# Patient Record
Sex: MALE | Race: WHITE | NOT HISPANIC OR LATINO | Employment: FULL TIME | URBAN - METROPOLITAN AREA
[De-identification: names, ages, dates, MRNs, and addresses within clinical notes are randomized per-mention and may not be internally consistent; named-entity substitution may affect disease eponyms.]

---

## 2018-02-02 ENCOUNTER — APPOINTMENT (EMERGENCY)
Dept: RADIOLOGY | Facility: HOSPITAL | Age: 28
End: 2018-02-02
Payer: COMMERCIAL

## 2018-02-02 ENCOUNTER — HOSPITAL ENCOUNTER (EMERGENCY)
Facility: HOSPITAL | Age: 28
Discharge: HOME | End: 2018-02-02
Attending: EMERGENCY MEDICINE
Payer: COMMERCIAL

## 2018-02-02 VITALS
SYSTOLIC BLOOD PRESSURE: 158 MMHG | DIASTOLIC BLOOD PRESSURE: 96 MMHG | HEART RATE: 68 BPM | RESPIRATION RATE: 17 BRPM | HEIGHT: 72 IN | WEIGHT: 220 LBS | TEMPERATURE: 98.3 F | OXYGEN SATURATION: 99 % | BODY MASS INDEX: 29.8 KG/M2

## 2018-02-02 DIAGNOSIS — R07.9 RIGHT-SIDED CHEST PAIN: ICD-10-CM

## 2018-02-02 DIAGNOSIS — R05.9 COUGH: Primary | ICD-10-CM

## 2018-02-02 PROCEDURE — 71046 X-RAY EXAM CHEST 2 VIEWS: CPT

## 2018-02-02 PROCEDURE — 93005 ELECTROCARDIOGRAM TRACING: CPT

## 2018-02-02 PROCEDURE — 99284 EMERGENCY DEPT VISIT MOD MDM: CPT

## 2018-02-02 RX ORDER — NAPROXEN 250 MG/1
500 TABLET ORAL ONCE
Status: COMPLETED | OUTPATIENT
Start: 2018-02-02 | End: 2018-02-02

## 2018-02-02 RX ORDER — OXYMETAZOLINE HYDROCHLORIDE 0.05 G/100ML
2 SPRAY NASAL ONCE
Status: COMPLETED | OUTPATIENT
Start: 2018-02-02 | End: 2018-02-02

## 2018-02-02 RX ORDER — CYCLOBENZAPRINE HCL 10 MG
10 TABLET ORAL ONCE
Status: COMPLETED | OUTPATIENT
Start: 2018-02-02 | End: 2018-02-02

## 2018-02-02 RX ORDER — CYCLOBENZAPRINE HCL 10 MG
10 TABLET ORAL 3 TIMES DAILY PRN
Qty: 21 TABLET | Refills: 0 | Status: SHIPPED | OUTPATIENT
Start: 2018-02-02 | End: 2018-02-09

## 2018-02-02 RX ORDER — NAPROXEN 500 MG/1
500 TABLET ORAL 2 TIMES DAILY WITH MEALS
Qty: 20 TABLET | Refills: 0 | Status: SHIPPED | OUTPATIENT
Start: 2018-02-02 | End: 2018-02-12

## 2018-02-02 RX ORDER — ALBUTEROL SULFATE 90 UG/1
2 AEROSOL, METERED RESPIRATORY (INHALATION) ONCE
Status: COMPLETED | OUTPATIENT
Start: 2018-02-02 | End: 2018-02-02

## 2018-02-02 RX ADMIN — OXYMETAZOLINE HYDROCHLORIDE 2 SPRAY: 5 SPRAY NASAL at 18:58

## 2018-02-02 RX ADMIN — NAPROXEN 500 MG: 250 TABLET ORAL at 18:58

## 2018-02-02 RX ADMIN — CYCLOBENZAPRINE HYDROCHLORIDE 10 MG: 10 TABLET, FILM COATED ORAL at 19:00

## 2018-02-02 RX ADMIN — ALBUTEROL SULFATE 2 PUFF: 90 AEROSOL, METERED RESPIRATORY (INHALATION) at 18:59

## 2018-02-02 NOTE — ED PROVIDER NOTES
History  Chief Complaint   Patient presents with    Chest Pain     Pt presnets with a severe cold/cough over the last three weeks  Pt is concerned because he coughed so hard today he felt a "pop" on the right side, and now is exp pain with inspiration     31-year-old male without past medical history presenting with chief complaint of chest pain and cough  Patient is here with his mother and reports that for the last 2-3 weeks he has had hacking cough postnasal drip, earlier this week his young son accidentally hit him in the right ribs and he has had right-sided rib pain described as sharp and stabbing it is worse when he takes a deep breath and moves, he states that today he was coughing and felt thought he felt a pop in the right side of his chest in his right axilla sad worsening discomfort  Patient is localized to his right anterior lateral chest and axilla is nonradiating it is again worse when he touches it lays on that side twists bends moves and takes deep breath in, he has not taken anything for this, he denies associated symptoms with this although he notes that he is currently under the care of [facility] doctor for recurrent ear infections  Patient denies recent fevers change in ear pain or drainage denies sore throat neck pain denies hemoptysis production shortness of breath at this time other recent viral illness he denies other chest pain nausea vomiting abdominal pain flank pain change in bowels or bladder leg swelling calf pain tenderness rashes or other associated symptoms  Denies all risk factors other signs symptoms of DVT or PE denies tobacco use no risk factors for ACS  None       History reviewed  No pertinent past medical history  Past Surgical History:   Procedure Laterality Date    TONSILLECTOMY         History reviewed  No pertinent family history  I have reviewed and agree with the history as documented      Social History   Substance Use Topics    Smoking status: Never Smoker    Smokeless tobacco: Never Used    Alcohol use Yes      Comment: social        Review of Systems   Constitutional: Negative for activity change, appetite change, chills and fever  HENT: Positive for postnasal drip and rhinorrhea  Negative for ear pain, facial swelling and sore throat  Eyes: Negative for photophobia and pain  Respiratory: Positive for cough  Negative for shortness of breath  Cardiovascular: Positive for chest pain  Negative for palpitations  Gastrointestinal: Negative for abdominal pain, diarrhea, nausea and vomiting  Endocrine: Negative for polydipsia and polyphagia  Genitourinary: Negative for dysuria, frequency and urgency  Musculoskeletal: Negative for arthralgias, back pain and myalgias  Skin: Negative for color change and rash  Neurological: Negative for dizziness and weakness  Hematological: Negative for adenopathy  Does not bruise/bleed easily  Psychiatric/Behavioral: Negative for agitation and behavioral problems  All other systems reviewed and are negative  Physical Exam  ED Triage Vitals [02/02/18 1703]   Temperature Pulse Respirations Blood Pressure SpO2   98 3 °F (36 8 °C) 68 17 158/96 99 %      Temp Source Heart Rate Source Patient Position - Orthostatic VS BP Location FiO2 (%)   Oral Monitor Sitting Left arm --      Pain Score       Worst Possible Pain           Orthostatic Vital Signs  Vitals:    02/02/18 1703   BP: 158/96   Pulse: 68   Patient Position - Orthostatic VS: Sitting       Physical Exam   Constitutional: He is oriented to person, place, and time  He appears well-developed and well-nourished  No distress  HENT:   Head: Normocephalic and atraumatic  Right Ear: External ear normal    Left Ear: External ear normal    Mouth/Throat: Oropharynx is clear and moist    Tms are clear despite hx, PND noted otherwise unremarkable ENT exam   Eyes: EOM are normal  Pupils are equal, round, and reactive to light     Neck: Normal range of motion  Neck supple  No tracheal deviation present  Cardiovascular: Normal rate, regular rhythm and normal heart sounds  Exam reveals no gallop and no friction rub  No murmur heard  Pulmonary/Chest: Effort normal and breath sounds normal  He has no wheezes  He has no rales  He exhibits tenderness  Clear and equal breath sounds, moderate right sided chest tenderness, no overlying skin changes   Abdominal: Soft  Bowel sounds are normal  He exhibits no distension  There is no tenderness  There is no rebound and no guarding  Musculoskeletal: Normal range of motion  He exhibits no edema or tenderness  Neurological: He is alert and oriented to person, place, and time  No cranial nerve deficit  He exhibits normal muscle tone  Coordination normal    Skin: Skin is warm and dry  No rash noted  Psychiatric: He has a normal mood and affect  His behavior is normal    Nursing note and vitals reviewed  ED Medications  Medications   cyclobenzaprine (FLEXERIL) tablet 10 mg (10 mg Oral Given 2/2/18 1900)   naproxen (NAPROSYN) tablet 500 mg (500 mg Oral Given 2/2/18 1858)   albuterol (PROVENTIL HFA,VENTOLIN HFA) inhaler 2 puff (2 puffs Inhalation Given 2/2/18 1859)   oxymetazoline (AFRIN) 0 05 % nasal spray 2 spray (2 sprays Each Nare Given 2/2/18 1858)       Diagnostic Studies  Results Reviewed     None                 XR chest 2 views   ED Interpretation by Paco Kidd DO (02/02 2013)   No acute abnormality      Final Result by Jena Arizmendi MD (02/02 2027)      No active pulmonary disease           Workstation performed: [id number]                    Procedures  Procedures       Phone Contacts  ED Phone Contact    ED Course  ED Course as of Feb 04 0448 Fri Feb 02, 2018   1915  EKG is normal sinus rhythm 72 beats per minute normal access normal intervals nonspecific T-wave abnormality no previous EKGs to compare to    2014 Patient seen evaluated at discharge, understands agrees to discharge return instructions                                MDM  Number of Diagnoses or Management Options  Cough:   Right-sided chest pain:   Diagnosis management comments: 78-year-old male without significant past medical history of several weeks of cough postnasal drip and congestion now with several days of right-sided chest pain and tenderness that is worse when he moves twists lays on that side as well as takes a deep breath in no shortness of breath production or hemoptysis no fevers he has a otherwise afebrile normal vital signs patient is clinically very well appearing is clear and equal breath sounds he is tender on his right lateral chest no peripheral edema or calf tenderness he has no risk factors signs symptoms of DVT or PE PERC 0, likely msk chest pain, history and physical exam is not consistent with myocarditis, ACS, though will get EKG to rule out pericarditis, will get chest x-ray to exclude pneumothorax, pneumonia, likely discharge with symptom control close return follow-up instructions    CritCare Time    Disposition  Final diagnoses:   Cough   Right-sided chest pain     Time reflects when diagnosis was documented in both MDM as applicable and the Disposition within this note     Time User Action Codes Description Comment    2/2/2018  8:15 PM Jeovany Brice Add [R05] Cough     2/2/2018  8:15 PM Freida Lomas Add [R07 9] Right-sided chest pain       ED Disposition     ED Disposition Condition Comment    Discharge  Adelaida Vaughn discharge to home/self care      Condition at discharge: Good        Follow-up Information     Follow up With Specialties Details Why Contact Info Additional Information    [facility] Emergency Department Emergency Medicine  If symptoms worsen [address] [address] ED, [address] [place] [address], [id number]        Discharge Medication List as of 2/2/2018  8:21 PM      START taking these medications    Details cyclobenzaprine (FLEXERIL) 10 mg tablet Take 1 tablet (10 mg total) by mouth 3 (three) times a day as needed for muscle spasms for up to 7 days, Starting Fri 2/2/2018, Until Fri 2/9/2018, Print      naproxen (NAPROSYN) 500 mg tablet Take 1 tablet (500 mg total) by mouth 2 (two) times a day with meals for 10 days, Starting Fri 2/2/2018, Until Mon 2/12/2018, Print           No discharge procedures on file      ED Provider  Electronically Signed by           Philippe Seay DO  02/04/18 0780

## 2018-02-04 LAB
ATRIAL RATE: 72 BPM
P AXIS: 42 DEGREES
PR INTERVAL: 144 MS
QRS AXIS: 39 DEGREES
QRSD INTERVAL: 94 MS
QT INTERVAL: 360 MS
QTC INTERVAL: 394 MS
T WAVE AXIS: 19 DEGREES
VENTRICULAR RATE: 72 BPM

## 2018-02-04 PROCEDURE — 93010 ELECTROCARDIOGRAM REPORT: CPT | Performed by: INTERNAL MEDICINE
